# Patient Record
Sex: FEMALE | Race: WHITE
[De-identification: names, ages, dates, MRNs, and addresses within clinical notes are randomized per-mention and may not be internally consistent; named-entity substitution may affect disease eponyms.]

---

## 2019-12-16 ENCOUNTER — HOSPITAL ENCOUNTER (EMERGENCY)
Dept: HOSPITAL 39 - ER | Age: 44
Discharge: HOME | End: 2019-12-16
Payer: SELF-PAY

## 2019-12-16 VITALS — OXYGEN SATURATION: 99 % | TEMPERATURE: 97.6 F

## 2019-12-16 VITALS — DIASTOLIC BLOOD PRESSURE: 71 MMHG | SYSTOLIC BLOOD PRESSURE: 112 MMHG

## 2019-12-16 DIAGNOSIS — L02.31: Primary | ICD-10-CM

## 2019-12-16 DIAGNOSIS — J45.909: ICD-10-CM

## 2019-12-16 DIAGNOSIS — Z87.891: ICD-10-CM

## 2019-12-16 NOTE — ED.PDOC
History of Present Illness





- General


Chief Complaint: Skin/Abrasion/Tear


Stated Complaint: abscess


Time Seen by Provider: 12/16/19 15:32


Source: patient





- History of Present Illness


Initial Comments: 





45 yo female who presents with cc of painful lesion on left buttocks.  Onset 6 

days ago, gradual worsening, now reports sharp, constant, 7/10 severity, no 

radiation, worse with palpation, has been doing warm compresses & bathes with 

little relief.  Did start draining some blood & pus today.  No fevers, chills, 

or other sx's.


Allergies/Adverse Reactions: 


Allergies





NO KNOWN ALLERGY Allergy (Verified 12/16/19 14:11)


   





Home Medications: 


Ambulatory Orders





Doxycycline Hyclate 100 mg PO BID 7 Days #14 tab 12/16/19 











Review of Systems





- Review of Systems


Review of Systems: 





12/16/19 15:33


as per HPI


All other Systems: Reviewed and Negative





Past Medical History (General)





- Patient Medical History


Hx Asthma: Yes





- Vaccination History


Hx Influenza Vaccination: No


Hx Pneumococcal Vaccination: No





- Social History


Hx Tobacco Use: Yes


Hx Alcohol Use: Yes - socially


Hx Substance Use: No


Hx Substance Use Treatment: No





- Female History


Patient is a Female of Child Bearing Age (10 -59 yrs old): Yes





- Triage Comment


ED Triage Comment: HX of tubal





Family Medical History





- Family History


  ** Mother


Family History: No Known





Physical Exam





- Physical Exam


General Appearance: Alert, Comfortable, No apparent distress


Eye Exam: bilateral normal


Ears, Nose, Throat: hearing grossly normal, normal ENT inspection


Neck: non-tender, full range of motion, supple, normal inspection


Respiratory: lungs clear, normal breath sounds


Cardiovascular/Chest: normal peripheral pulses, regular rate, rhythm, no murmur


Gastrointestinal/Abdominal: non tender, soft


Back Exam: normal inspection


Extremity: normal range of motion, non-tender, normal inspection


Neurologic: no motor/sensory deficits, alert, normal mood/affect, oriented x 3


Skin Exam: normal color, other - approx 3x3 cm abscess to left buttocks region 

with pointing and spontaneous drainage of blood/pus mixture, moderately ttp and 

warm





Progress





- Progress


Progress: 





12/16/19 15:35


Left buttocks skin abscess


-spontaneous drainage noted - able to express approx 2-3 cc of bloody purulent 

fluid and sent for cx's


-will place on doxy 100 BID x7 days


-continued home trx and return warnings discussed


-dc home in good condition





Harpreet Claire MD


Billing #[id number]





Departure





- Departure


Clinical Impression: 


Skin abscess


Qualifiers:


 Site of cutaneous abscess: buttock Qualified Code(s): L02.31 - Cutaneous absc

ess of buttock





Time of Disposition: 15:36


Disposition: Discharge to Home or Self Care


Condition: Good


Departure Forms:  ED Discharge - Pt. Copy, Patient Portal Self Enrollment


Instructions:  Boil (DC)


Diet: resume usual diet


Prescriptions: 


Doxycycline Hyclate 100 mg PO BID 7 Days #14 tab


Home Medications: 


Ambulatory Orders





Doxycycline Hyclate 100 mg PO BID 7 Days #14 tab 12/16/19

## 2020-05-28 ENCOUNTER — HOSPITAL ENCOUNTER (OUTPATIENT)
Dept: HOSPITAL 39 - RAD | Age: 45
End: 2020-05-28
Attending: NURSE PRACTITIONER
Payer: COMMERCIAL

## 2020-05-28 DIAGNOSIS — M25.512: Primary | ICD-10-CM

## 2020-05-28 NOTE — RAD
EXAM DESCRIPTION: 

Scapula,Left: CR/DR/XR.



CLINICAL HISTORY:

44 years Female, SCAPULALGIA



COMPARISON:

None.



TECHNIQUE:

2 views AP and transthoracic views left scapula.



FINDINGS:

No dislocation or fracture. No abnormal soft tissues. No abnormal

radiodense objects in the soft tissues. Glenohumeral joint and AC

joints are intact.



IMPRESSION:

No acute bony abnormality of the left scapula. If dysfunction of

the scapula is suspected clinically, consider follow-up CT scan

or MRI scan.



Electronically signed by:  Holden Carter MD  5/28/2020 7:56 PM CDT

Workstation: [id number]